# Patient Record
Sex: MALE
[De-identification: names, ages, dates, MRNs, and addresses within clinical notes are randomized per-mention and may not be internally consistent; named-entity substitution may affect disease eponyms.]

---

## 2017-07-19 ENCOUNTER — HOSPITAL ENCOUNTER (EMERGENCY)
Dept: HOSPITAL 53 - M ED | Age: 42
Discharge: HOME | End: 2017-07-19
Payer: OTHER GOVERNMENT

## 2017-07-19 VITALS — BODY MASS INDEX: 31.12 KG/M2 | HEIGHT: 74 IN | WEIGHT: 242.51 LBS

## 2017-07-19 VITALS — DIASTOLIC BLOOD PRESSURE: 87 MMHG | SYSTOLIC BLOOD PRESSURE: 143 MMHG

## 2017-07-19 DIAGNOSIS — J34.1: ICD-10-CM

## 2017-07-19 DIAGNOSIS — E87.1: Primary | ICD-10-CM

## 2017-07-19 DIAGNOSIS — R42: ICD-10-CM

## 2017-07-19 DIAGNOSIS — F17.200: ICD-10-CM

## 2017-07-19 DIAGNOSIS — E87.6: ICD-10-CM

## 2017-07-19 DIAGNOSIS — I10: ICD-10-CM

## 2017-07-19 DIAGNOSIS — F41.9: ICD-10-CM

## 2017-07-19 DIAGNOSIS — M54.9: ICD-10-CM

## 2017-07-19 DIAGNOSIS — F10.99: ICD-10-CM

## 2017-07-19 LAB
ALBUMIN SERPL BCG-MCNC: 3.9 GM/DL (ref 3.2–5.2)
ALBUMIN/GLOB SERPL: 0.91 {RATIO} (ref 1–1.93)
ALP SERPL-CCNC: 80 U/L (ref 45–117)
ALT SERPL W P-5'-P-CCNC: 150 U/L (ref 12–78)
ANION GAP SERPL CALC-SCNC: 8 MEQ/L (ref 8–16)
AST SERPL-CCNC: 125 U/L (ref 15–37)
BASOPHILS # BLD AUTO: 0.1 K/MM3 (ref 0–0.2)
BASOPHILS NFR BLD AUTO: 1 % (ref 0–1)
BILIRUB CONJ SERPL-MCNC: 0.2 MG/DL (ref 0–0.2)
BILIRUB SERPL-MCNC: 0.9 MG/DL (ref 0.2–1)
BUN SERPL-MCNC: 8 MG/DL (ref 7–18)
CALCIUM SERPL-MCNC: 9.4 MG/DL (ref 8.5–10.1)
CHLORIDE SERPL-SCNC: 87 MEQ/L (ref 98–107)
CO2 SERPL-SCNC: 33 MEQ/L (ref 21–32)
CREAT SERPL-MCNC: 0.91 MG/DL (ref 0.7–1.3)
EOSINOPHIL # BLD AUTO: 0.2 K/MM3 (ref 0–0.5)
EOSINOPHIL NFR BLD AUTO: 1.5 % (ref 0–3)
ERYTHROCYTE [DISTWIDTH] IN BLOOD BY AUTOMATED COUNT: 11.6 % (ref 11.5–14.5)
GFR SERPL CREATININE-BSD FRML MDRD: > 60 ML/MIN/{1.73_M2} (ref 60–?)
GLUCOSE SERPL-MCNC: 100 MG/DL (ref 70–105)
LARGE UNSTAINED CELL #: 0.2 K/MM3 (ref 0–0.4)
LARGE UNSTAINED CELL %: 1.3 % (ref 0–4)
LYMPHOCYTES # BLD AUTO: 2.5 K/MM3 (ref 1.5–4.5)
LYMPHOCYTES NFR BLD AUTO: 19.4 % (ref 24–44)
MAGNESIUM SERPL-MCNC: 2 MG/DL (ref 1.8–2.4)
MCH RBC QN AUTO: 33.8 PG (ref 27–33)
MCHC RBC AUTO-ENTMCNC: 37.2 G/DL (ref 32–36.5)
MCV RBC AUTO: 90.9 FL (ref 80–96)
METHADONE UR QL SCN: NEGATIVE
MONOCYTES # BLD AUTO: 0.8 K/MM3 (ref 0–0.8)
MONOCYTES NFR BLD AUTO: 6.5 % (ref 0–5)
NEUTROPHILS # BLD AUTO: 8.9 K/MM3 (ref 1.8–7.7)
NEUTROPHILS NFR BLD AUTO: 70.2 % (ref 36–66)
PLATELET # BLD AUTO: 242 K/MM3 (ref 150–450)
POTASSIUM SERPL-SCNC: 3 MEQ/L (ref 3.5–5.1)
PROT SERPL-MCNC: 8.2 GM/DL (ref 6.4–8.2)
SODIUM SERPL-SCNC: 128 MEQ/L (ref 136–145)
WBC # BLD AUTO: 12.7 K/MM3 (ref 4–10)

## 2017-07-19 PROCEDURE — 85025 COMPLETE CBC W/AUTO DIFF WBC: CPT

## 2017-07-19 PROCEDURE — 70551 MRI BRAIN STEM W/O DYE: CPT

## 2017-07-19 PROCEDURE — 96360 HYDRATION IV INFUSION INIT: CPT

## 2017-07-19 PROCEDURE — 70450 CT HEAD/BRAIN W/O DYE: CPT

## 2017-07-19 PROCEDURE — 93005 ELECTROCARDIOGRAM TRACING: CPT

## 2017-07-19 PROCEDURE — 82553 CREATINE MB FRACTION: CPT

## 2017-07-19 PROCEDURE — 80048 BASIC METABOLIC PNL TOTAL CA: CPT

## 2017-07-19 PROCEDURE — 36415 COLL VENOUS BLD VENIPUNCTURE: CPT

## 2017-07-19 PROCEDURE — 82550 ASSAY OF CK (CPK): CPT

## 2017-07-19 PROCEDURE — 80076 HEPATIC FUNCTION PANEL: CPT

## 2017-07-19 PROCEDURE — 99284 EMERGENCY DEPT VISIT MOD MDM: CPT

## 2017-07-19 PROCEDURE — 83735 ASSAY OF MAGNESIUM: CPT

## 2017-07-19 PROCEDURE — 80307 DRUG TEST PRSMV CHEM ANLYZR: CPT

## 2017-07-19 PROCEDURE — 82140 ASSAY OF AMMONIA: CPT

## 2017-07-19 NOTE — ED PDOC
Post-Departure Follow-Up


WHEN ASKED ABOUT PT ALCOHOL USE, PT ASKED, "YOU'RE NOT GOING TO WRITE THAT DOWN

, ARE YOU?" PT STATES HE DRINKS " A HEFTY CUP-FULL" OF LIQUOR EVERYDAY. PT 

STATES, "I HATE IT WHEN I GO TO ANY [employer] DOCTOR BECAUSE WHEN THEY ASK ABOUT 

DRINKING, THEY SAY, 'YOU HAVE TO GO TO [facility]' SO I JUST STOPPED." PT ADMITS HE 

DIDN'T STOP DRINKING, HE JUST STOPPED TELLING THE [employer] DOCTORS HOW MUCH HE 

DRINKS OR THAT HE DRINKS AT ALL. PT ALSO SMOKES CIGARETTES. DISCUSSED RISKS AND 

SYMPTOMS WITH THE PT AND THAT THIS WOULD REQUIRE A WORKUP. PT ANXIOUS ABOUT ANY 

OF THIS INFORMATION GETTING TO THE [employer]. ADVISED UNAWARE IF THEY WOULD HAVE 

ACCESS TO HIS MEDICAL INFORMATION FROM THIS VISIT TODAY.











QUYNH DE LOS SANTOS PA-C Jul 19, 2017 13:48

## 2017-07-19 NOTE — ECGEPIP
Stationary ECG Study

                           [facility] - ED

                                       

                                       Test Date:    2017

Pat Name:     WAQAS ARMANDO              Department:   

Patient ID:   [id number]                 Room:         -

Gender:       M                        Technician:   ar

:          1975               Requested By: QUYNH CORDERO PA-C

Order Number: [id number]     Reading MD:   Mynor Chung

                                 Measurements

Intervals                              Axis          

Rate:         91                       P:            58

SD:           146                      QRS:          84

QRSD:         98                       T:            59

QT:           377                                    

QTc:          466                                    

                           Interpretive Statements

SINUS RHYTHM

POSSIBLE LAE

NO PRIORS

Electronically Signed On 2017 19:40:15 EDT by Mynor Chung

## 2017-07-19 NOTE — REP
MR BRAIN WITHOUT CONTRAST:

 

HISTORY: Vertigo

 

COMPARISON: CT 07/19/2017.

 

There are no areas of abnormal signal intensity in the brain. There is no

intraparenchymal hemorrhage infarct, mass or midline shift. The ventricular

system and cortical sulci as well as subarachnoid space and the posterior fossa

are dilated consistent with moderate volume loss. There is no extracerebral

collection. The mastoid air cells are clear. Mucosal thickening is present in the

maxillary right ethmoid and left sphenoid sinuses. A retention cyst is present in

the right maxillary sinus.

 

IMPRESSION:

 

There is no intracranial lesion.

 

 

Signed by

Mathieu Wolf MD 07/20/2017 08:17 A

## 2017-07-19 NOTE — REP
CT Head without contrast

 

HISTORY:  Vertigo

 

COMPARISON: None

 

There is no intraparenchymal hemorrhage, acute infarct,  mass or midline shift.

The ventricular system is normal in appearance.  There is no extra cerebral

collection.  There is no fracture.  The visualized sinuses are clear.

 

IMPRESSION:  There is no intracranial lesion.

 

 

 

 

Signed by

Mathieu Wolf MD 07/19/2017 01:53 P

## 2017-08-08 ENCOUNTER — HOSPITAL ENCOUNTER (OUTPATIENT)
Dept: HOSPITAL 53 - M PAIN | Age: 42
Discharge: HOME | End: 2017-08-08
Attending: NURSE PRACTITIONER
Payer: OTHER GOVERNMENT

## 2017-08-08 DIAGNOSIS — G89.29: Primary | ICD-10-CM

## 2017-08-08 DIAGNOSIS — M51.16: ICD-10-CM

## 2017-08-08 DIAGNOSIS — Z79.899: ICD-10-CM

## 2017-08-08 DIAGNOSIS — K21.9: ICD-10-CM

## 2017-08-08 DIAGNOSIS — I10: ICD-10-CM

## 2017-08-08 DIAGNOSIS — J30.9: ICD-10-CM

## 2017-08-08 DIAGNOSIS — F17.210: ICD-10-CM

## 2017-08-26 NOTE — ECWPNPC
PATIENT NAME: WAQAS ARMANDO

: 1975

GENDER: MALE

MRN: 

VISIT DATE: 2017

DISCHARGE DATE: 17 1242

VISIT LOCKED DATE TIME: 

PHYSICIAN: MORALES MOJICA 

RESOURCE: MORALES MOJICA 

 

           

           

REASON FOR APPOINTMENT

           

          1. LOW BACK PAIN

           

HISTORY OF PRESENT ILLNESS

           

      NEW PATIENT CONSULT:

      WHEN DID YOUR PAIN FIRST START?

          _____.

           

      BRIEFLY DESCRIBE HOW YOUR PAIN STARTED?

          ______.

           

      HOW DOES YOUR PAIN CHANGE WITH TIME?

          _______.

           

      DOES YOUR PAIN AWAKEN YOU FROM SLEEP?

          _____.

           

      HOW MANY HOURS OF SLEEP DO YOU NORMALLY GET?

          ______.

           

      ANY DIAGNOSTIC TESTING?

          _____.

           

      FACILITY WHERE TESTS WERE DONE?

          ____.

           

      PAIN TREATMENT

           

           

          TREATMENT YES

           

      CANCER

           

           

          HAVE YOU EVER HAD ANY TYPE OF CANCER?NO

           

           

          NO.

           

      PAIN SCREENING:

      PATIENT HAS A COMPLAINT OF ACUTE OR CHRONIC PAIN

           

           

          :YES

           

      FALL RISK SCREENING:

      SCREENING

           

           

          :NO FALLS IN THE PAST YEAR

           

      BECK INVENTORY:

      QUESTIONNAIRE

           

           

          ASSESSEDYES

           

      SCORE

           

           

          VALUE CALCULATED YES

          SCORE: 37/63 DENIES SUICIDAL OR HOMICIDAL IDEATION. WHEN

          QUESTIONED STATES HE HAS LITTLE ANXIETY

           

      TODAY'S VISIT:

      NOTES:

          REFERRED BY [facility] PCP FOR BACK PAIN. ONSET ABOUT 15 YEARS AFTER

          MUCH PHYSCIAL ACTIVITY. RECENTLY HAS BEEN HAVING MORE DISCOMFORT

          OVER LOW BACK NOTES PAIN WORSE AT THE END OF THE DAY OR WITH PT.

          PAIN IS CENTERED OVER LOW BACK WITH RADIATION DOWN RIGHT LEG TO

          THE LEVEL OF THE FOOT. RIGHT FOOT IS NUMB AND PAINFUL. DRIVING

          AND PUSHING ON BRAKE PEDAL MAKES LATERAL ASPECT AND OVER THE

          DORSUM OF FOOT. SLEEP CAN BE AFFECTINGBY THROBBING IN RIGHT FOOT.

          NO SPECIFIC TRAUMA. TREATMENT HAS INCLUDED CHIRO, POOL PT, AND

          PROFILE. NO LOSS OF BOWEL OR BLADDER CONTROL. HAS SEEN [facility]/T IN [facility]. HAD MRI THERE..

           

CURRENT MEDICATIONS

           

          TAKING HYDROCHLOROTHIAZIDE 25 MG TABLET 1 TABLET IN THE MORNING

          ORALLY ONCE A DAY

          TAKING MULTI COMPLETE - CAPSULE ORALLY

          TAKING ASPIRIN 325 MG TABLET 1 TABLET ORALLY AS NEEDED

          TAKING PANTOPRAZOLE SODIUM 20 MG TABLET DELAYED RELEASE 2 TABLETS

          ORALLY ONCE A DAY

          TAKING AMLODIPINE BESYLATE 5 MG TABLET 1 TABLET ORALLY ONCE A DAY

          TAKING MELATONIN 3 MG TABLET 1 TABLET AT BEDTIME AS NEEDED WITH

          FOOD ORALLY ONCE A DAY

          NOT-TAKING NEXIUM 20 MG CAPSULE DELAYED RELEASE 1 CAPSULE ORALLY

          ONCE A DAY

          NOT-TAKING TIZANIDINE HCL 4 MG TABLET 1 TABLET AS NEEDED ORALLY

          THREE TIMES A DAY

          NOT-TAKING MELOXICAM 7.5 MG/5ML SUSPENSION 5 ML ORALLY ONCE A DAY

          NOT-TAKING LIDOCAINE & ADHESIVE SHEET 5 % KIT EXTERNALLY

          MEDICATION LIST REVIEWED AND RECONCILED WITH THE PATIENT

           

PAST MEDICAL HISTORY

           

          HYPERTENSION

          HEARTBURN

          BACK PAIN

           

ALLERGIES

           

          SEASON ALLERGIES

           

SURGICAL HISTORY

           

          NO SURGICAL HISTORY DOCUMENTED.

           

FAMILY HISTORY

           

          FATHER: ALIVE, DIAGNOSED WITH HYPERTENSION

          MOTHER: ALIVE

          SIBLINGS: UNKNOWN

          SON(S): ALIVE

          DAUGHTER(S): ALIVE

           

SOCIAL HISTORY

           

          GENERAL:

           

          TOBACCO USE

          ARE YOU A:CURRENT SMOKER

          HOW MANY CIGARETTES A DAY DO YOU SMOKE?21-30

          HOW SOON AFTER YOU WAKE UP DO YOU SMOKE YOUR FIRST

          CIGARETTE?WITHIN 5 MIN

          HOW OFTEN DO YOU SMOKE CIGARETTES?EVERY DAY

          PATIENT COUNSELED ON THE DANGERS OF TOBACCO USE AND URGED TO

          QUIT:2017

          ARE YOU INTERESTED IN QUITTING?NOT READY TO QUIT

          COUNSELED THE PATIENT ON SMOKING EFFECTS, EDUCATION

          [id number]/08/2017

          SMOKING CESSATION INFORMATION [id number]

           

           

          ALCOHOL SCREENING

          [id number]

          INTERPRETATIONPOSITIVE

           

           

          CAFFEINE

          CAFFEINE USE?NO

           

           

          Faith

          Faith O PREFERENCE

           

           

          LANGUAGE

          LANGUAGES SPOKEN:ENGLISH

           

           

          EDUCATION

          LEVEL OF EDUCATION:HIGH SCHOOL

           

           

          LEARNING BARRIERS / SPECIAL NEEDS

          SPECIAL DEVICES?YES CPAP AT NIGHT

          :OTHER

           

           

          PAIN CLINIC PFS, CLERGY, PUBLIC HEALTH REFERRALS

          CLERGY REFERRAL NEEDED?NO

          WAS THE PROVIDER NOTIFIED OF ANY PERTINENT INFO?NO

          PFS REFERRAL NEEDED?NO

          PUBLIC HEALTH REFERRAL NEEDED?NO

           

           

          PATIENT: ____.

           

           

          ADVANCE DIRECTIVES

          HEALTH CARE PROXY?NO

          WOULD YOU LIKE MORE INFORMATION?NO

          DO YOU HAVE A DNR?NO

          WOULD YOU LIKE MORE INFORMATION?NO

          LIVING WILL?NO

          WOULD YOU LIKE MORE INFORMATION?NO

           

HOSPITALIZATION/MAJOR DIAGNOSTIC PROCEDURE

           

          NO HOSPITALIZATION HISTORY.

           

REVIEW OF SYSTEMS

           

      REVIEWED BY:

           

          PROVIDER: MORALES GODOY .

           

      CONSTITUTIONAL:

           

          ANY CHANGE IN YOUR MEDICAL CONDITION? NO . CHILLS NO . FEVER NO .

           

      INFECTION:

           

          DO YOU HAVE NEW INFECTIONS? NO . DO YOU HAVE HISTORY OF MRSA? NO

          .

           

      MUSCULOSKELETAL:

           

          ANY NEW PATTERNS OF PAIN OR NUMBNESS? YES DOWN THE RIGHT

          LEG..&QUOT;WHEN DRIVING&QUOT; . SYTEMIC LUPUS NO .

           

      GASTROENTEROLOGY:

           

          ANY NEW CHANGE IN BOWEL CONTROL? NO . BARRETTS ESOPHAGUS NO .

          CIRRHOSIS NO . HEPATITIS NO . LIVER FAILURE NO . ACID REFLUX YES

          PROTONIX TAKEN . UNEXPLAINED WEIGHT LOSS NO .

           

      GENITOURINARY:

           

          ANY NEW CHANGE IN BLADDER CONTROL? NO . IS THERE A CHANCE YOU

          COULD BE PREGNANT? NO .

           

      HEMATOLOGY/LYMPH:

           

          DO YOU TAKE ANY BLOOD THINNERS? (FOR EXAMPLE- COUMADIN, PLAVIX,

          AGGRENOX, PLATEL, PRADAXA, OR XARELTO) NO . WHEN WAS YOUR LAST

          DOSE? DATE: TIME: . LOW PLATELET COUNT NO . SICKLE CELL DISEASE

          NO . VON WILLIEBRANDS NO . FACTOR V LEIDEN NO . THALLASEMIA NO .

          ANEMIA NO . EASY BRUISING NO .

           

      NEUROLOGY:

           

          HAVE YOU FALLEN IN THE PAST 6 MONTHS? NO . ANY NEW EXTREMITY

          NUMBNESS OR WEAKNESS? NO . HEAD INJURY NO . DEMENTIA NO .

          CEREBRAL PALSY NO . MULTIPLE SCLEROSIS NO . DIZZINESS NO .

          HEADACHE NO . STROKES NO . VERTIGO NO .

           

      CARDIOLOGY:

           

          DO YOU HAVE A PACEMAKER OR DEFIBRILLATOR? NO . ANGINA NO . HEART

          ATTACK NO . HEART SURGERY NO . CONGESTIVE HEART FAILURE/FLUID

          OVERLOAD NO . CHEST PAIN NO . HIGH BLOOD PRESSURE YES - UNDER

          CONTROLMNO LE EDEMA . IRREGULAR HEART BEAT NO .

           

      RESPIRATORY:

           

          HAVE YOU BEEN SICK IN THE PAST WEEK? NO . FEVER NO . FLU LIKE

          SYMPTOMS? NO . CPAP NO . BYPAP NO . ASTHMA NO . EMPHYSEMA NO .

          CHRONIC LUNG DISEASES NO . SHORTNESS OF BREATH ON EXERTION NO .

          DO YOU USE ANY TYPE OF TOBACCO (SMOKE, SMOKELESS, CHEW)? YES -

          NOT CONSIDERING QUITTING . COUGH NO . SNORING YES PT USES A CPAP

          .

           

      INTEGUMENTARY:

           

          DO YOU HAVE ANY RASHES OR OPEN SORES? NO .

           

      ALLERGIC/IMMUNO:

           

          ARE YOU ALLERGIC TO SHELLFISH OR IV DYE? NO . ANY NEW ALLERGIES?

          NO .

           

      PSYCHIATRIC:

           

          DO YOU HAVE THOUGHTS OF HURTING YOURSELF OR SOMEONE ELSE? NO .

          ARE YOU ABUSED, NEGLECTED, OR IN AN UNSAFE ENVIRONMENT? NO .

           

      ENDOCRINOLOGY:

           

          ARE YOU DIABETIC? NO . THYROID DISORDER NO .

           

      OTHER:

           

          DO YOU NEED ANY PRESCRIPTIONS? NO . IF YES, PLEASE LIST: ____ .

          ANY NEW PROBLEMS WITH YOUR MEDICATIONS? NO . WHEN DID YOU LAST

          EAT? ____ . WHEN DID YOU LAST DRINK? ____ . WHAT DID YOU LAST

          DRINK? ____ . NAME OF PERSON DRIVING YOU HOME? ____ . DO YOU HAVE

          ANY OTHER QUESTIONS OR CONCERNS NO .

           

      PSYCHOLOGY:

           

          ANXIETY MILD .

           

VITAL SIGNS

           

          .8 LBS, HT 72 IN, BMI 32.11 INDEX, /98 MM HG, 

          /MIN, RR 18 /MIN, TEMP 97.5 F, OXYGEN SAT % 96%, NA INITIALS AW

          1119, REVIEWED BY: KG.

           

EXAMINATION

           

      GENERAL EXAMINATION:

          MUSCULOSKELETAL:LSA FLEX TO 90, THEN DISC, OK TO ROTATE, EXT TO

          15. NO SIJ. VERY TIGHT HAMSTRINGS BILAT NO SLR NO PATRICKS, NO

          PAIN WITH PELVIC COMPRESSION , MUSCLE STRENGTH TESTING 5/5

          BILATERAL.

           

          EXTREMITIES:NO EDEMA.

           

          NEUROLOGIC EXAM:TRACE TO 1+ BU/LE. NO SENSORY.

           

ASSESSMENTS

           

          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

           

          LUMBAGO WITH SCIATICA, RIGHT SIDE - M54.41

           

          OTHER CHRONIC PAIN - G89.29

           

TREATMENT

           

      DISPLACEMENT OF LUMBAR DISC WITH RADICULOPATHY

          NOTES: INTRALAMINAR LUMBAR EPIDURAL CONTINUE POOL THERAPY.

          CONSIDER YOGA ON POST. STRETCH HAMSTRINGS.

          CLINICAL NOTES: OPTION FOR EPIDURAL INJECTIONS WERE DISCUSSED

          WITH THE PATIENT. FDA CONCERNS AND WARNING WERE REVIEWED

          INCLUDING THE RISK OF BLEEDING, RISK OF INFECTION, RISK OF

          INCREASED PAIN OR NEURALGIA, AND RISK OF PARALYSIS. PATIENT'S

          QUESTIONS WERE ANSWERED AND HE/SHE WISHES TO MOVE FORWARD WITH

          EPIDURAL INJECTION.

           

PREVENTIVE MEDICINE

           

      PAIN CLINIC TEACHING:

           

          PROCEDURE TEACHING WENT OVER PROCEDURE AND GAVE PRINTERD MATERIAL

          TO PT WHO VERBALIZES UNDERSTANDING.

           

PROCEDURE CODES

           

           ESTABILISHED PATIENT [facility] FACILITY CHARGE

           

DISPOSITION & COMMUNICATION

           

FOLLOW UP

           

          AFTER INJECTION (REASON: CHECK AUTH FOR INTRALAMINAR LUMBAR

          EPIDURAL )

           

 

ELECTRONICALLY SIGNED BY TIMOTHY FUCHS ON

          2017 AT 04:25 PM EDT

           

           

           

 

DISCLAIMER :

THIS IS A VISIT SUMMARY EXTRACTED FROM THE [employer] CHART.

IT IS NOT A COPY OF THE [employer] PROGRESS NOTE.

[facility] [place]

## 2018-04-24 ENCOUNTER — HOSPITAL ENCOUNTER (EMERGENCY)
Dept: HOSPITAL 53 - M ED | Age: 43
Discharge: HOME | End: 2018-04-24
Payer: COMMERCIAL

## 2018-04-24 DIAGNOSIS — M54.9: ICD-10-CM

## 2018-04-24 DIAGNOSIS — F17.200: ICD-10-CM

## 2018-04-24 DIAGNOSIS — I10: ICD-10-CM

## 2018-04-24 DIAGNOSIS — Z79.899: ICD-10-CM

## 2018-04-24 DIAGNOSIS — J06.9: Primary | ICD-10-CM

## 2018-04-24 LAB
ANION GAP: 7 MEQ/L (ref 8–16)
BASO #: 0.1 10^3/UL (ref 0–0.2)
BASO %: 1.1 % (ref 0–1)
BLOOD UREA NITROGEN: 12 MG/DL (ref 7–18)
CALCIUM LEVEL: 9.2 MG/DL (ref 8.5–10.1)
CARBON DIOXIDE LEVEL: 28 MEQ/L (ref 21–32)
CHLORIDE LEVEL: 106 MEQ/L (ref 98–107)
CK MB CFR.DF SERPL CALC: 0.97
CK SERPL-CCNC: 113 U/L (ref 39–308)
CK-MB VALUE MASS: 1.1 NG/ML (ref ?–3.6)
CREATININE FOR GFR: 0.84 MG/DL (ref 0.7–1.3)
EOS #: 0.6 10^3/UL (ref 0–0.5)
EOSINOPHIL NFR BLD AUTO: 5.1 % (ref 0–3)
GFR SERPL CREATININE-BSD FRML MDRD: > 60 ML/MIN/{1.73_M2} (ref 60–?)
GLUCOSE, FASTING: 113 MG/DL (ref 70–100)
HEMATOCRIT: 47.8 % (ref 42–52)
HEMOGLOBIN: 16.3 G/DL (ref 13.5–17.5)
IMMATURE GRANULOCYTE %: 0.4 % (ref 0–3)
LYMPH #: 2.5 10^3/UL (ref 1.5–4.5)
LYMPH %: 22.4 % (ref 24–44)
MEAN CORPUSCULAR HEMOGLOBIN: 32.6 PG (ref 27–33)
MEAN CORPUSCULAR HGB CONC: 34.1 G/DL (ref 32–36.5)
MEAN CORPUSCULAR VOLUME: 95.6 FL (ref 80–96)
MONO #: 1.3 10^3/UL (ref 0–0.8)
MONO %: 11.9 % (ref 0–5)
NEUTROPHILS #: 6.6 10^3/UL (ref 1.8–7.7)
NEUTROPHILS %: 59.1 % (ref 36–66)
NRBC BLD AUTO-RTO: 0 % (ref 0–0)
PLATELET COUNT, AUTOMATED: 173 10^3/UL (ref 150–450)
POTASSIUM SERUM: 3.9 MEQ/L (ref 3.5–5.1)
RED BLOOD COUNT: 5 10^6/UL (ref 4.3–6.1)
RED CELL DISTRIBUTION WIDTH: 13.2 % (ref 11.5–14.5)
SODIUM LEVEL: 141 MEQ/L (ref 136–145)
TROPONIN I: < 0.02 NG/ML (ref ?–0.1)
WHITE BLOOD COUNT: 11.1 10^3/UL (ref 4–10)

## 2018-04-24 PROCEDURE — 71046 X-RAY EXAM CHEST 2 VIEWS: CPT
